# Patient Record
Sex: FEMALE | Race: OTHER | NOT HISPANIC OR LATINO
[De-identification: names, ages, dates, MRNs, and addresses within clinical notes are randomized per-mention and may not be internally consistent; named-entity substitution may affect disease eponyms.]

---
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
## 2022-05-31 NOTE — H&P PST PEDIATRIC - HEPATITIS C
Called patient and scheduled medication check appointment for 6/2/22.    Christine Duarte RN    No Exposure

## 2023-10-20 NOTE — ASU DISCHARGE PLAN (ADULT/PEDIATRIC). - RETURN TO WORK
Addended by: PAMELA SIDDIQUI on: 10/20/2023 04:31 PM     Modules accepted: Orders    
when cleared by MD

## 2025-01-28 ENCOUNTER — NON-APPOINTMENT (OUTPATIENT)
Age: 25
End: 2025-01-28

## 2025-03-13 ENCOUNTER — NON-APPOINTMENT (OUTPATIENT)
Age: 25
End: 2025-03-13

## 2025-04-09 ENCOUNTER — NON-APPOINTMENT (OUTPATIENT)
Age: 25
End: 2025-04-09

## 2025-09-10 ENCOUNTER — NON-APPOINTMENT (OUTPATIENT)
Age: 25
End: 2025-09-10

## 2025-09-12 ENCOUNTER — NON-APPOINTMENT (OUTPATIENT)
Age: 25
End: 2025-09-12